# Patient Record
Sex: MALE | Employment: OTHER | URBAN - METROPOLITAN AREA
[De-identification: names, ages, dates, MRNs, and addresses within clinical notes are randomized per-mention and may not be internally consistent; named-entity substitution may affect disease eponyms.]

---

## 2023-06-23 ENCOUNTER — OFFICE VISIT (OUTPATIENT)
Dept: FAMILY MEDICINE | Facility: CLINIC | Age: 78
End: 2023-06-23
Payer: COMMERCIAL

## 2023-06-23 VITALS
HEART RATE: 66 BPM | OXYGEN SATURATION: 95 % | WEIGHT: 192 LBS | TEMPERATURE: 98.2 F | DIASTOLIC BLOOD PRESSURE: 72 MMHG | SYSTOLIC BLOOD PRESSURE: 151 MMHG

## 2023-06-23 DIAGNOSIS — S93.402A SPRAIN OF LEFT ANKLE, UNSPECIFIED LIGAMENT, INITIAL ENCOUNTER: ICD-10-CM

## 2023-06-23 DIAGNOSIS — R21 RASH DUE TO ALLERGY: Primary | ICD-10-CM

## 2023-06-23 DIAGNOSIS — T78.40XA RASH DUE TO ALLERGY: Primary | ICD-10-CM

## 2023-06-23 PROCEDURE — 99203 OFFICE O/P NEW LOW 30 MIN: CPT

## 2023-06-23 RX ORDER — LOVASTATIN 10 MG
10 TABLET ORAL AT BEDTIME
COMMUNITY

## 2023-06-23 RX ORDER — TRIAMCINOLONE ACETONIDE 1 MG/G
CREAM TOPICAL 2 TIMES DAILY
Qty: 45 G | Refills: 0 | Status: SHIPPED | OUTPATIENT
Start: 2023-06-23

## 2023-06-23 NOTE — PATIENT INSTRUCTIONS
Take zyrtec or generic in the am, one tablet. Take benedryl 25 mg tabs 1 - 2 tabs at bedtime. Apply trimcinolone cream light coating twice daily. Avoid scratching. Observe for infection - increased reddness, swelling or drainage, fever, chills or sweats go to urgent care or ER. If rash continues to spread be reseen. If breathing difficulty go to the ER.     For left ankle wear ankle brace when walking or standing. Elevate leg, apply ice 20 min / hour.     Follow up with primary care provider for ankle and rash.

## 2023-06-23 NOTE — PROGRESS NOTES
Assessment & Plan     Rash due to allergy    - triamcinolone (KENALOG) 0.1 % external cream  Dispense: 45 g; Refill: 0    Sprain of left ankle, unspecified ligament, initial encounter    - Ankle/Foot Bracing Supplies DME Ankle Brace; Left       Patient Instructions   Take zyrtec or generic in the am, one tablet. Take benedryl 25 mg tabs 1 - 2 tabs at bedtime. Apply trimcinolone cream light coating twice daily. Avoid scratching. Observe for infection - increased reddness, swelling or drainage, fever, chills or sweats go to urgent care or ER. If rash continues to spread be reseen. If breathing difficulty go to the ER.     For left ankle wear ankle brace when walking or standing. Elevate leg, apply ice 20 min / hour.     Follow up with primary care provider for ankle and rash.       Return in about 1 week (around 6/30/2023), or if symptoms worsen or fail to improve.    [facility]    Dmitri Jo is a 78 year old male who presents to clinic today for the following health issues:  Chief Complaint   Patient presents with     Inflammation of Arm     Received a vitamin B shot last Friday. Left arm was itching like crazy- and it spread to his other arm, chest, upper and lower back. Using a cream for itching. Looks like hives, sores or maybe welts. Left ankle has given out- hoping her can have it wrapped.      Patient reports that he got a vitamin b-12 shot in his left upper arm one week ago. That evening he started getting a rash on the upper arm. Over the past week the rash has gradually spread to his entire left arm, upper chest area, back to upper hips, back of neck to lower scalp and right upper arm. The rash is mildly itchy. No fever or sweats, some chills. He has been using a cream that his grandson uses and taking a zyrtec. No breathing difficulty.    Patient reports pain and instability in his left ankle at times. No recent  injury.         Review of Systems        Objective    BP (!) 151/72   Pulse 66   Temp 98.2  F (36.8  C) (Tympanic)   Wt 87.1 kg (192 lb)   SpO2 95%   Physical Exam  Vitals and nursing note reviewed.   Constitutional:       General: He is not in acute distress.     Appearance: Normal appearance. He is not ill-appearing or diaphoretic.   HENT:      Head: Normocephalic and atraumatic.      Right Ear: Tympanic membrane, ear canal and external ear normal.      Left Ear: Tympanic membrane, ear canal and external ear normal.      Nose: Nose normal.      Mouth/Throat:      Mouth: Mucous membranes are moist.      Pharynx: Oropharynx is clear. Posterior oropharyngeal erythema present. No oropharyngeal exudate.   Eyes:      Conjunctiva/sclera: Conjunctivae normal.      Pupils: Pupils are equal, round, and reactive to light.   Cardiovascular:      Rate and Rhythm: Normal rate and regular rhythm.      Heart sounds: Normal heart sounds.   Pulmonary:      Effort: Pulmonary effort is normal.      Breath sounds: Normal breath sounds.   Musculoskeletal:      Cervical back: Neck supple. No tenderness.      Comments: Left ankle mild edema proximal posterior ankle. No bruising. Able to stand. Pain on palpation in the area.    Lymphadenopathy:      Cervical: No cervical adenopathy.   Skin:     General: Skin is warm and dry.      Comments: Skin left arm, right upper arm, upper chest, back down to upper hips and up to base of skull multiple lesions, some scabbed over, mild elevation, mild erythema, no drainage.    Neurological:      General: No focal deficit present.      Mental Status: He is alert and oriented to person, place, and time.   Psychiatric:         Mood and Affect: Mood normal.         Behavior: Behavior normal.         Thought Content: Thought content normal.         Judgment: Judgment normal.